# Patient Record
Sex: FEMALE | Race: WHITE
[De-identification: names, ages, dates, MRNs, and addresses within clinical notes are randomized per-mention and may not be internally consistent; named-entity substitution may affect disease eponyms.]

---

## 2019-06-24 ENCOUNTER — HOSPITAL ENCOUNTER (EMERGENCY)
Dept: HOSPITAL 75 - ER | Age: 74
Discharge: HOME | End: 2019-06-24
Payer: MEDICARE

## 2019-06-24 VITALS — BODY MASS INDEX: 30.12 KG/M2 | HEIGHT: 63 IN | WEIGHT: 170 LBS

## 2019-06-24 VITALS — SYSTOLIC BLOOD PRESSURE: 132 MMHG | DIASTOLIC BLOOD PRESSURE: 82 MMHG

## 2019-06-24 DIAGNOSIS — Z90.89: ICD-10-CM

## 2019-06-24 DIAGNOSIS — Z87.81: ICD-10-CM

## 2019-06-24 DIAGNOSIS — S40.812A: ICD-10-CM
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
## 2019-06-24 DIAGNOSIS — Z90.710: ICD-10-CM

## 2019-06-24 DIAGNOSIS — I10: ICD-10-CM
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
## 2019-06-24 DIAGNOSIS — S00.81XA: Primary | ICD-10-CM

## 2019-06-24 DIAGNOSIS — Z88.5: ICD-10-CM

## 2019-06-24 DIAGNOSIS — S80.812A: ICD-10-CM

## 2019-06-24 DIAGNOSIS — Z98.1: ICD-10-CM

## 2019-06-24 PROCEDURE — 72125 CT NECK SPINE W/O DYE: CPT

## 2019-06-24 PROCEDURE — 70450 CT HEAD/BRAIN W/O DYE: CPT

## 2019-06-24 PROCEDURE — 90715 TDAP VACCINE 7 YRS/> IM: CPT

## 2019-06-24 NOTE — XMS REPORT
MU2 Ambulatory Summary

                             Created on: 2018



Zoraida Matson

External Reference #: [id number]

: 1945

Sex: Female



Demographics







                          Address                   [address] [place]  [address]

 

                          Home Phone                [phone]

 

                          Preferred Language        English

 

                          Marital Status            

 

                          Religion Affiliation     Unknown

 

                          Race                      White

 

                          Ethnic Group              Not  or 





Author







                          Author                    Mian Villafuerte

 

                          [facility]

 

                          Address                   [address]

[place], [place]  [id number]



 

                          Phone                     [phone]







Care Team Providers







                    Care Team Member Name    Role                Phone

 

                    Mian Villafuerte       PCP                 [phone]







Allergies and Adverse Reactions







                    Name                Reaction            Notes

 

                    Percocet                                 







Plan of Treatment

Not available.



Medications







                                        Active 

 

             Name         Start Date    Estimated Completion Date    SIG          Comments

 

                Aleve 220 mg oral tablet                                    take 2 tablet (440 mg) by oral route every 8 hours

 as needed                               

 

                losartan-hydrochlorothiazide 50-12.5 mg oral tablet                                    take 1 tablet by oral

 route once daily                        

 

             Kenalog injection                              80mg IM q 3 months prn     

 

                omeprazole 20 mg oral capsule,delayed release(DR/EC)                                    take 1 capsule (20 mg)

 by oral route once daily before a meal     

 

             Curcumin 95 % miscellaneous powder                              Tumeric Curcumin 500mg daily     

 

                Ventolin HFA 90 mcg/actuation inhalation HFA aerosol inhaler                                    inhale 1 puff

 (90 mcg) by inhalation route every 6 hours as needed     

 

             Vitamin D3 2,000 unit oral tablet                              take 2 tablets by oral route daily     

 

                Tylenol Extra Strength 500 mg oral tablet                                    take 2 tablets (1,000 mg) by oral

 route at bedtime                        









                                         

 

             Name         Start Date    Expiration Date    SIG          Comments

 

                cyanocobalamin (vitamin B-12) 1,000 mcg oral tablet                                    take 1 tablet by oral

 route daily                             

 

             Norvasc 5 mg oral tablet                              take 1 tablet (5 mg) by oral route once daily     









Problem List







                    Description         Status              Onset

 

                    Lipoma              Active              2017

 

                    Lipoma              Active              2017

 

                    Lipoma              Active              2018

 

                    Lipoma              Active              2018

 

                    Lipoma              Active              2018

 

                    Lipoma              Active              2018







Vital Signs







      Date    Time    BP-Sys(mm[Hg]    BP-Rosa(mm[Hg])    HR(bpm)    RR(rpm)    Temp    WT    HT    HC    BMI

                    BSA                 BMI Percentile      O2 Sat(%)

 

        2018    11:23:00 AM    144 mmHg    87 mmHg    72 bpm    20 rpm    97.7 F    182.5 lbs      

                                                                  

 

        2018    12:53:00 PM    144 mmHg    87 mmHg    72 bpm    18 rpm    98.1 F    178.5 lbs    63

 in                       31.6195 kg/m    1.8971 m                  

 

        1/3/2018    11:43:00 AM    156 mmHg    79 mmHg    69 bpm    20 rpm    98.2 F    179 lbs    63 in

                          31.71 kg/m2    1.90 m2                    

 

        2017    10:57:00 AM    160 mmHg    82 mmHg    77 bpm    20 rpm    97.8 F    176 lbs    63 

in                        31.1767 kg/m    1.8838 m                  







Social History







                    Name                Description         Comments

 

                    denies alcohol use                         

 

                    Tobacco             Never smoker         







History of Procedures

Not available.



Results Summary

Not available.



History Of Immunizations

Not available.



History of Past Illness







                    Name                Date of Onset       Comments

 

                    Arthritis of knee, right                         

 

                    Asthma                                   

 

                    Diverticulosis of colon                         

 

                    GERD (gastroesophageal reflux disease)                         

 

                    Hemorrhoids                              

 

                    Hypertension                             

 

                    Hyperlipemia                             

 

                    Allergies                                

 

                    Osteoarthritis of hip, unspecified                         

 

                    Prediabetes                              

 

                    Lipoma              2018           

 

                    Lipoma              Dec 20 2017 11:00AM     

 

                    Lipoma              Dec 20 2017 12:33PM     

 

                    Lipoma              Narinder  3 2018 11:47AM     

 

                    Lipoma              2018 12:55PM     







Payers







           Insurance Name    Company Name    Plan Name    Plan Number    Policy Number    Policy Group

 Number                                 Start Date

 

                    Medicare Part B    [employer] [place]              [id number]              N/A

 

                    Aetna Medicare Supplement    [employer] Medicare Supplement              [id number]              N/A







History of Encounters







                    Visit Date          Visit Type          Provider

 

                    2018           Procedures          Mian Villafuerte DO

 

                    2018           Procedures          Mian Villafuerte DO

 

                    1/3/2018            Procedures          Mian Villafuerte DO

 

                    2017          Procedures          Mian Villafuerte DO

## 2019-06-24 NOTE — ED TRAUMA-MULTISYSTEM
General


Chief Complaint:  Trauma-Non Activation


Stated Complaint:  FALL;HEAD INJ


Nursing Triage Note:  


PT STATES SHE WAS WALKING INTO A STORE WHEN SHE TRIPPED AND FELL FACE FORWARD. 


PT DENIES LOC, PT DENIES BLOOD THINNERS. PT HAS A LACERATION TO LEFT HAND. PT 


HAS ABRASIONS TO RIGHT SIDE OF FACE. PT HAS HISTORY OF FUSION OF NECK. PT DENIES




CSPINE TENDERNESS.





History of Present Illness


Date Seen by Provider:  2019


Time Seen by Provider:  13:15


Initial Comments


74-year-old  female presents for fall with facial trauma. She was 

getting out of her daughter's car in a parking lot when she tripped and fell 

face first. He sustained abrasions to her for head, nose, and chin she also has 

a small laceration to her left wrist. She had cervical spinal surgery in the 

last 6 months. She is denying any neck pain, vision changes, loss of 

consciousness, paresthesias or radicular symptoms in the upper extremities, or 

nausea/vomiting. She is unsure of her last tetanus immunization. She is denying 

chest pain or dyspnea. She does not take aspirin or any blood thinners. She was 

able to stand on her own after the fall.


Occurred:  Just Prior to Arrival


Pain/Injury Location:  Face, Head


Method of Injury:  Fall


Loss of Consciousness:  No Loss of Consciousness


Associated Symptoms (Fall):  Denies Symptoms; No Lightheadedness, No Muscle 

Spasms, No Nausea/Vomiting; Neck Pain; No Shortness of Air, No Slurred Speech, 

No Trouble Walking, No Vision Changes





Allergies and Home Medications


Allergies


Coded Allergies:  


     acetaminophen (Verified  Allergy, Unknown, 19)


     oxycodone (Verified  Allergy, Unknown, 19)





Home Medications


Tramadol HCl 50 Mg Tablet, 50 MG PO Q6H PRN for PAIN


   Prescribed by: ASTRID AVILEZ on 19 4580





Patient Home Medication List


Home Medication List Reviewed:  Yes





Review of Systems


Review of Systems


Constitutional:  no symptoms reported, see HPI


Eyes:  No Symptoms Reported, See HPI


Ears:  No Symptoms Reported, See HPI


Nose:  See HPI, Pain


Mouth:  No Symptoms Reported, See HPI; No Loose Teeth


Throat:  No Symptoms to Report, See HPI


Respiratory:  no symptoms reported, see HPI


Cardiovascular:  No Symptoms Reported, See HPI


Gastrointestinal:  no symptoms reported, see HPI


Genitourinary:  no symptoms reported, see HPI


Skin:  see HPI, other (abrasion to face and left wrist)


Psychiatric/Neurological:  No Symptoms Reported





All Other Systems Reviewed


Negative Unless Noted:  Yes





Past Medical-Social-Family Hx


Past Med/Social Hx:  Reviewed Nursing Past Med/Soc Hx


Patient Social History


Alcohol Use:  Denies Use


Recreational Drug Use:  No


Smoking Status:  Never a Smoker


2nd Hand Smoke Exposure:  No


Recent Foreign Travel:  No


Contact w/Someone Who Travel:  No


Recent Infectious Disease Expo:  No


Recent Hopitalizations:  Yes





Immunizations Up To Date


PED Vaccines UTD:  Yes





Seasonal Allergies


Seasonal Allergies:  No





Past Medical History


Surgeries:  Yes (recent neck fusion, hip replacements, left femur fx repair, jose manuel

dder )


Abdominal, Bladder Surgery,  Section, Hysterectomy, Joint Replacement, 

Orthopedic, Tonsillectomy


Respiratory:  No


Cardiac:  Yes


Hypertension


Neurological:  No


GYN History:  Hysterectomy


Genitourinary:  Yes (bladder surgery)


Gastrointestinal:  No


Musculoskeletal:  Yes (neck fusion)


Fractures


Endocrine:  No


HEENT:  No


Cancer:  No


Psychosocial:  No


Integumentary:  Yes (MRSA hx)





Physical Exam


Vital Signs





Vital Signs - First Documented








 19





 12:03 14:52


 


Temp 97.6 


 


Pulse 78 


 


Resp 18 


 


B/P (MAP) 149/98 (115) 


 


Pulse Ox 99 


 


O2 Delivery  Room Air








Height, Weight, BMI


Height: 5'3.00"


Weight: 170lbs. oz. 77.094003ni;  BMI


Method:Stated


General Appearance:  No Apparent Distress, WD/WN


Head:  Contusions, Ecchymosis, Tenderness





Progress/Results/Core Measures


Results/Orders


My Orders





Orders - ASTRID AVILEZ


Ct Head/Cervical Spine Wo (19 13:30)


Dipht,Pertuss(Acell),Tet Adult (Boostrix (19 13:45)


Tramadol Tablet (Ultram Tablet) (19 15:00)





Medications Given in ED





Current Medications








 Medications  Dose


 Ordered  Sig/Eugenia


 Route  Start Time


 Stop Time Status Last Admin


Dose Admin


 


 Diphtheria/


 Tetanus/Acell


 Pertussis  0.5 ml  ONCE ONCE


 IM  19 13:45


 19 13:46 DC 19 13:39


0.5 ML


 


 Tramadol HCl  50 mg  ONCE  ONCE


 PO  19 15:00


 19 15:00 DC 19 14:49


50 MG








Vital Signs/I&O











 19





 12:03 14:52


 


Temp 97.6 97.7


 


Pulse 78 66


 


Resp 18 16


 


B/P (MAP) 149/98 (115) 132/82 (99)


 


Pulse Ox 99 96


 


O2 Delivery  Room Air














Blood Pressure Mean:                    115











Progress


Progress Note :  


   Time:  13:15


Progress Note


Patient seen and evaluated, abrasions for cleaned with Hibiclens and sterile 

water. Will obtain CT of head and neck. Tetanus vaccine.


1400 patient complaining of mild headache, no neurological changes, tramadol 50 

mg 1 by mouth orally for pain.


1430 patient reports improvement in pain. CT negative for acute findings. Apply 

antibiotic ointment and Band-Aids applied to wounds. Discharge instructions and 

return precautions reviewed with the patient QUESTIONS answered.





Diagnostic Imaging





   Diagonstic Imaging:  CT


   Plain Films/CT/US/NM/MRI:  c-spine, head


Comments


NAME:   BETO RODRIGUEZ


[facility] REC#:   [id number]


ACCOUNT#:   [id number]


PT STATUS:   REG ER


:   1945


PHYSICIAN:   ASTRID AVILEZ


ADMIT DATE:   19/ER


                                   ***Draft***


Date of Exam:19





CT HEAD/CERVICAL SPINE WO








PROCEDURE: CT head and CT cervical spine without contrast.





TECHNIQUE: Multiple contiguous axial images were obtained through


the brain and cervical spine without the use of intravenous


contrast. Sagittal and coronal reformations through the cervical


spine were then performed. Auto Exposure Controls were utilized


during the CT exam to meet ALARA standards for radiation dose


reduction. 





INDICATION: Fall.





COMPARISON: No prior studies are available for comparison.





CT HEAD:





The ventricles and sulci are within normal limits. No sulcal


effacement or midline shift is seen. No acute intra-axial or


extra-axial hemorrhage is detected. Cisterns are patent.


Visualized paranasal sinuses are clear.





IMPRESSION: No acute intracranial process is detected.





CT CERVICAL SPINE:





Curvature and alignment of the cervical spine is normal. There


are postop changes of ACDF with anterior plate and screws


extending from C3 through C6. Hardware appears to be intact. No


fracture or loosening is seen. Odontoid is intact. Prevertebral


tissues are normal.





IMPRESSION: C3 through C6 ACDF. No acute feature is detected.





  Dictated on workstation # [id number]








Dict:   19 1412


Trans:   19 1419


 [id number]





Interpreted by:     STEFF PRESTON MD


Electronically signed by:





Departure


Impression





   Primary Impression:  


   Fall


   Qualified Codes:  W19.XXXA - Unspecified fall, initial encounter


   Additional Impression:  


   Abrasion of face and extremities


   Qualified Codes:  S00.81XA - Abrasion of other part of head, initial 

   encounter; S40.812A - Abrasion of left upper arm, initial encounter; S80.812A

   - Abrasion, left lower leg, initial encounter


Disposition:   HOME, SELF-CARE


Condition:  Improved





Departure-Patient Inst.


Decision time for Depature:  14:30


Patient Instructions:  Minor Head Injury (DC), Wound Care (DC)





Add. Discharge Instructions:  


Move slowly from a sitting/lying position to standing.


Increase water intake.


Follow-up with your primary care provider later this week.


Use the tramadol 1 tablet every 8 hours as needed for pain.


For additional pain relief he may take Tylenol 650 mg every 6-8 hours.


Ice for 20 minutes to any areas of tenderness or pain.


Brain rest: Limit screen time (smart phones, tablets, computers, tv) and wear 

sunglasses. 


Return to emergency department for headache not relieved by tramadol or Tylenol,

seizure activity, difficulty concentrating, vision changes or any other urgent 

health care needs.





All discharge instructions reviewed with patient and/or family. Voiced 

understanding.


Scripts


Tramadol HCl (Tramadol HCl) 50 Mg Tablet


50 MG PO Q6H PRN for PAIN, #20 TAB 0 Refills


   Prov: ASTRID AVILEZ         19











ASTRID AVILEZ                  2019 13:50

## 2019-06-24 NOTE — NUR
Patient ambulatory to ER room 10 with family members. Patient complains of a 
fall today. She states she fell forward and tried to catch herself. patient has 
abrasions to her nose and face and abrasions to the left wrist area. Patient is 
complaining of neck pain. She states she always has neck pain but recently had 
surgery on her neck to have a fusion and titanum plate put in. Patient denies 
being on blood thinners. She had no loss of consciousness.

## 2019-06-24 NOTE — XMS REPORT
MU2 Ambulatory Summary

                             Created on: 2018



Zoraida Matson

External Reference #: [id number]

: 1945

Sex: Female



Demographics







                          Address                   [address] [place]  [address]

 

                          Home Phone                [phone]

 

                          Preferred Language        English

 

                          Marital Status            

 

                          Anabaptist Affiliation     Unknown

 

                          Race                      White

 

                          Ethnic Group              Not  or 





Author







                          Author                    Mian Villafuerte

 

                          [facility]

 

                          Address                   [address]

[place], [place]  [id number]



 

                          Phone                     [phone]







Care Team Providers







                    Care Team Member Name    Role                Phone

 

                    Mian Villafuerte       PCP                 [phone]







Allergies and Adverse Reactions







                    Name                Reaction            Notes

 

                    Percocet                                 







Plan of Treatment

Not available.



Medications







                                        Active 

 

             Name         Start Date    Estimated Completion Date    SIG          Comments

 

                Aleve 220 mg oral tablet                                    take 2 tablet (440 mg) by oral route every 8 hours

 as needed                               

 

                losartan-hydrochlorothiazide 50-12.5 mg oral tablet                                    take 1 tablet by oral

 route once daily                        

 

             Kenalog injection                              80mg IM q 3 months prn     

 

                omeprazole 20 mg oral capsule,delayed release(DR/EC)                                    take 1 capsule (20 mg)

 by oral route once daily before a meal     

 

             Curcumin 95 % miscellaneous powder                              Tumeric Curcumin 500mg daily     

 

                Ventolin HFA 90 mcg/actuation inhalation HFA aerosol inhaler                                    inhale 1 puff

 (90 mcg) by inhalation route every 6 hours as needed     

 

             Vitamin D3 2,000 unit oral tablet                              take 2 tablets by oral route daily     

 

                Tylenol Extra Strength 500 mg oral tablet                                    take 2 tablets (1,000 mg) by oral

 route at bedtime                        









                                         

 

             Name         Start Date    Expiration Date    SIG          Comments

 

                cyanocobalamin (vitamin B-12) 1,000 mcg oral tablet                                    take 1 tablet by oral

 route daily                             

 

             Norvasc 5 mg oral tablet                              take 1 tablet (5 mg) by oral route once daily     









Problem List







                    Description         Status              Onset

 

                    Lipoma              Active              2017

 

                    Lipoma              Active              2017

 

                    Lipoma              Active              2018

 

                    Lipoma              Active              2018

 

                    Lipoma              Active              2018

 

                    Lipoma              Active              2018







Vital Signs







      Date    Time    BP-Sys(mm[Hg]    BP-Rosa(mm[Hg])    HR(bpm)    RR(rpm)    Temp    WT    HT    HC    BMI

                    BSA                 BMI Percentile      O2 Sat(%)

 

        2018    12:53:00 PM    144 mmHg    87 mmHg    72 bpm    18 rpm    98.1 F    178.5 lbs    63

 in                       31.62 kg/m2    1.90 m2                    

 

        1/3/2018    11:43:00 AM    156 mmHg    79 mmHg    69 bpm    20 rpm    98.2 F    179 lbs    63 in

                          31.7081 kg/m    1.8997 m                  

 

        2017    10:57:00 AM    160 mmHg    82 mmHg    77 bpm    20 rpm    97.8 F    176 lbs    63 

in                        31.18 kg/m2    1.88 m2                    







Social History







                    Name                Description         Comments

 

                    denies alcohol use                         

 

                    Tobacco             Never smoker         







History of Procedures

Not available.



Results Summary

Not available.



History Of Immunizations

Not available.



History of Past Illness







                    Name                Date of Onset       Comments

 

                    Arthritis of knee, right                         

 

                    Asthma                                   

 

                    Diverticulosis of colon                         

 

                    GERD (gastroesophageal reflux disease)                         

 

                    Hemorrhoids                              

 

                    Hypertension                             

 

                    Hyperlipemia                             

 

                    Allergies                                

 

                    Osteoarthritis of hip, unspecified                         

 

                    Prediabetes                              

 

                    Lipoma              2018           

 

                    Lipoma              Dec 20 2017 11:00AM     

 

                    Lipoma              Dec 20 2017 12:33PM     

 

                    Lipoma              Narinder  3 2018 11:47AM     

 

                    Lipoma              2018 12:55PM     







Payers







           Insurance Name    Company Name    Plan Name    Plan Number    Policy Number    Policy Group

 Number                                 Start Date

 

                    Medicare Part B    [employer]are [employer] [place]              [id number]              N/A

 

                    Aetna Medicare Supplement    Aetna Medicare Supplement              [id number]              N/A







History of Encounters







                    Visit Date          Visit Type          Provider

 

                    2018           Procedures          Mian Villafuerte DO

 

                    1/3/2018            Procedures          Mian Villafuerte DO

 

                    2017          Procedures          Mian Villafuerte DO

## 2019-06-24 NOTE — DIAGNOSTIC IMAGING REPORT
PROCEDURE: CT head and CT cervical spine without contrast.



TECHNIQUE: Multiple contiguous axial images were obtained through

the brain and cervical spine without the use of intravenous

contrast. Sagittal and coronal reformations through the cervical

spine were then performed. Auto Exposure Controls were utilized

during the CT exam to meet ALARA standards for radiation dose

reduction. 



INDICATION: Fall.



COMPARISON: No prior studies are available for comparison.



CT HEAD:



The ventricles and sulci are within normal limits. No sulcal

effacement or midline shift is seen. No acute intra-axial or

extra-axial hemorrhage is detected. Cisterns are patent.

Visualized paranasal sinuses are clear.



IMPRESSION: No acute intracranial process is detected.



CT CERVICAL SPINE:



Curvature and alignment of the cervical spine is normal. There

are postop changes of ACDF with anterior plate and screws

extending from C3 through C6. Hardware appears to be intact. No

fracture or loosening is seen. Odontoid is intact. Prevertebral

tissues are normal.



IMPRESSION: C3 through C6 ACDF. No acute feature is detected.



Dictated by: 



  Dictated on workstation # [id number]

## 2019-06-24 NOTE — XMS REPORT
MU2 Ambulatory Summary

                             Created on: 2018



Zoraida Matson

External Reference #: [id number]

: 1945

Sex: Female



Demographics







                          Address                   [address] [place]  [address]

 

                          Home Phone                [phone]

 

                          Preferred Language        English

 

                          Marital Status            

 

                          Restorationism Affiliation     Unknown

 

                          Race                      White

 

                          Ethnic Group              Not  or 





Author







                          Author                    Mian Villafuerte

 

                          Organization              [facility]

 

                          Address                   [address]

[place], [place]  [address]



 

                          Phone                     [phone]







Care Team Providers







                    Care Team Member Name    Role                Phone

 

                    Mian Villafuerte       PCP                 [phone]







Allergies and Adverse Reactions







                    Name                Reaction            Notes

 

                    Percocet                                 







Plan of Treatment

Not available.



Medications







                                        Active 

 

             Name         Start Date    Estimated Completion Date    SIG          Comments

 

                Aleve 220 mg oral tablet                                    take 2 tablet (440 mg) by oral route every 8 hours

 as needed                               

 

                losartan-hydrochlorothiazide 50-12.5 mg oral tablet                                    take 1 tablet by oral

 route once daily                        

 

             Kenalog injection                              80mg IM q 3 months prn     

 

                omeprazole 20 mg oral capsule,delayed release(DR/EC)                                    take 1 capsule (20 mg)

 by oral route once daily before a meal     

 

             Curcumin 95 % miscellaneous powder                              Tumeric Curcumin 500mg daily     

 

                Ventolin HFA 90 mcg/actuation inhalation HFA aerosol inhaler                                    inhale 1 puff

 (90 mcg) by inhalation route every 6 hours as needed     

 

             Vitamin D3 2,000 unit oral tablet                              take 2 tablets by oral route daily     

 

                Tylenol Extra Strength 500 mg oral tablet                                    take 2 tablets (1,000 mg) by oral

 route at bedtime                        









                                         

 

             Name         Start Date    Expiration Date    SIG          Comments

 

                cyanocobalamin (vitamin B-12) 1,000 mcg oral tablet                                    take 1 tablet by oral

 route daily                             

 

             Norvasc 5 mg oral tablet                              take 1 tablet (5 mg) by oral route once daily     









Problem List







                    Description         Status              Onset

 

                    Lipoma              Active              2017

 

                    Lipoma              Active              2017

 

                    Lipoma              Active              2018

 

                    Lipoma              Active              2018







Vital Signs







      Date    Time    BP-Sys(mm[Hg]    BP-Rosa(mm[Hg])    HR(bpm)    RR(rpm)    Temp    WT    HT    HC    BMI

                    BSA                 BMI Percentile      O2 Sat(%)

 

        1/3/2018    11:43:00 AM    156 mmHg    79 mmHg    69 bpm    20 rpm    98.2 F    179 lbs    63 in

                          31.71 kg/m2    1.90 m2                    

 

        2017    10:57:00 AM    160 mmHg    82 mmHg    77 bpm    20 rpm    97.8 F    176 lbs    63 

in                        31.1767 kg/m    1.8838 m                  







Social History







                    Name                Description         Comments

 

                    denies alcohol use                         

 

                    Tobacco             Never smoker         







History of Procedures

Not available.



Results Summary

Not available.



History Of Immunizations

Not available.



History of Past Illness







                    Name                Date of Onset       Comments

 

                    Arthritis of knee, right                         

 

                    Asthma                                   

 

                    Diverticulosis of colon                         

 

                    GERD (gastroesophageal reflux disease)                         

 

                    Hemorrhoids                              

 

                    Hypertension                             

 

                    Hyperlipemia                             

 

                    Allergies                                

 

                    Osteoarthritis of hip, unspecified                         

 

                    Prediabetes                              

 

                    Lipoma              2018           

 

                    Lipoma              Dec 20 2017 11:00AM     

 

                    Lipoma              Dec 20 2017 12:33PM     

 

                    Lipoma              Narinder  3 2018 11:47AM     







Payers







           Insurance Name    Company Name    Plan Name    Plan Number    Policy Number    Policy Group

 Number                                 Start Date

 

                    Medicare Part B    [place] [place]              [id number]              N/A

 

                    Aetna Medicare Supplement    Aetna Medicare Supplement              [id number]              N/A







History of Encounters







                    Visit Date          Visit Type          Provider

 

                    1/3/2018            Procedures          Mian Villafuerte DO

 

                    2017          Shira Villafuerte DO

## 2019-06-24 NOTE — XMS REPORT
MU2 Ambulatory Summary

                             Created on: 2017



Zoraida Matson

External Reference #: [id number]

: 1945

Sex: Female



Demographics







                          Address                   [address] [place]  [address]

 

                          Home Phone                [phone]

 

                          Preferred Language        English

 

                          Marital Status            

 

                          Protestant Affiliation     Unknown

 

                          Race                      White

 

                          Ethnic Group              Not  or 





Author







                          Author                    Mian Villafuerte

 

                          [facility]

 

                          Address                   [address]

[place], [place]  [address]



 

                          Phone                     [phone]







Care Team Providers







                    Care Team Member Name    Role                Phone

 

                    Mian Villafuerte       PCP                 [phone]







Allergies and Adverse Reactions







                    Name                Reaction            Notes

 

                    Percocet                                 







Plan of Treatment

Not available.



Medications







                                        Active 

 

             Name         Start Date    Estimated Completion Date    SIG          Comments

 

                Aleve 220 mg oral tablet                                    take 2 tablet (440 mg) by oral route every 8 hours

 as needed                               

 

                losartan-hydrochlorothiazide 50-12.5 mg oral tablet                                    take 1 tablet by oral

 route once daily                        

 

             Kenalog injection                              80mg IM q 3 months prn     

 

                omeprazole 20 mg oral capsule,delayed release(DR/EC)                                    take 1 capsule (20 mg)

 by oral route once daily before a meal     

 

             Curcumin 95 % miscellaneous powder                              Tumeric Curcumin 500mg daily     

 

                Ventolin HFA 90 mcg/actuation inhalation HFA aerosol inhaler                                    inhale 1 puff

 (90 mcg) by inhalation route every 6 hours as needed     

 

             Vitamin D3 2,000 unit oral tablet                              take 2 tablets by oral route daily     

 

                Tylenol Extra Strength 500 mg oral tablet                                    take 2 tablets (1,000 mg) by oral

 route at bedtime                        









                                         

 

             Name         Start Date    Expiration Date    SIG          Comments

 

                cyanocobalamin (vitamin B-12) 1,000 mcg oral tablet                                    take 1 tablet by oral

 route daily                             

 

             Norvasc 5 mg oral tablet                              take 1 tablet (5 mg) by oral route once daily     









Problem List







                    Description         Status              Onset

 

                    Lipoma              Active              2017

 

                    Lipoma              Active              2017







Vital Signs







      Date    Time    BP-Sys(mm[Hg]    BP-Rosa(mm[Hg])    HR(bpm)    RR(rpm)    Temp    WT    HT    HC    BMI

                    BSA                 BMI Percentile      O2 Sat(%)

 

        2017    10:57:00 AM    160 mmHg    82 mmHg    77 bpm    20 rpm    97.8 F    176 lbs    63 

in                        31.18 kg/m2    1.88 m2                    







Social History







                    Name                Description         Comments

 

                    denies alcohol use                         

 

                    Tobacco             Never smoker         







History of Procedures

Not available.



Results Summary

Not available.



History Of Immunizations

Not available.



History of Past Illness







                    Name                Date of Onset       Comments

 

                    Arthritis of knee, right                         

 

                    Asthma                                   

 

                    Diverticulosis of colon                         

 

                    GERD (gastroesophageal reflux disease)                         

 

                    Hemorrhoids                              

 

                    Hypertension                             

 

                    Hyperlipemia                             

 

                    Allergies                                

 

                    Osteoarthritis of hip, unspecified                         

 

                    Prediabetes                              

 

                    Lipoma              2017           

 

                    Lipoma              Dec 20 2017 11:00AM     

 

                    Lipoma              Dec 20 2017 12:33PM     







Payers







           Insurance Name    Company Name    Plan Name    Plan Number    Policy Number    Policy Group

 Number                                 Start Date

 

                    Medicare Part B    [place] [place]              [id number]              N/A

 

                    Aetna Medicare Supplement    Aetna Medicare Supplement              [id number]              N/A







History of Encounters







                    Visit Date          Visit Type          Provider

 

                    2017          Procedures          Mian Villafuerte DO

## 2019-06-24 NOTE — XMS REPORT
[facility] Clinical Summary

                             Created on: 2016



Zoraida Matson

External Reference #: [id number]

: 1945

Sex: Female



Demographics







                          Address                   [address] [place]  [address]

 

                          Home Phone                [phone]

 

                          Preferred Language        English

 

                          Marital Status            M

 

                          Religion Affiliation     Unknown

 

                          Race                      Unknown

 

                          Ethnic Group              Not  or 





Author







                          Author                    Admin, NILSON

 

                          Organization              [facility]

 

                          Address                   Unknown

 

                          Phone                     Unavailable







Allergies, Adverse Reactions, Alerts







           Allergy Name    Reaction Description    Start Date    Severity    Status     Provider

 

           PERCOCET    jaundice        Critical    Active     Wally Quiroz MD







Conditions or Problems







        Problem Name    Problem Code    Onset Date    Status    Entry Date    Provider    Comment    Standard

 Description                            Annotate

 

           Hypertension, benign essential    401.1          Active         Wally Quiroz MD                               Benign essential hypertension     

 

        Insect bite    919.4        Active        Wally Quiroz MD            Insect

 bite, nonvenomous, of other, multiple, and unspecified sites, without mention 
of infection                             

 

        GERD    530.81        Active        Wally Quiroz MD            Esophageal 

reflux                                   







Medication List







        Medication    Instructions    Start Date    Stop Date    Generic Name    NDC     Status    Provider

                                        Patient Instruction









                PREDNISONE 20 MG TABS    2 daily for 3 days then 1 daily for 3 days           

           PREDNISONE    62611037502    Active     Wally Quiroz MD               Active

 

             ACETAMINOPHEN 500 MG ORAL TABS    2 by mouth at bedtime                     ACETAMINOPHEN

             [id number]    Active       Wally Quiroz MD                 Active

 

             FISH OIL 1200 MG ORAL CAPS    Take one by mouth daily                     OMEGA-3 FATTY

 ACIDS       [id number]    Active       Wally Quiroz MD                 Active

 

             B-12 1000 MCG ORAL CAPS    Take one by mouth daily                     CYANOCOBALAMIN 

             [id number]    Active       Wally Quiroz MD                 Active

 

             ALEVE 220 MG ORAL CAPS    2 by mouth every morning                     NAPROXEN SODIUM

             [id number]    Active       Wally Quiroz MD                 Active

 

             SILYMARIN ORAL CAPS    Take one by mouth daily                     MILK THISTLE-TURMERIC

             [id number]    Active       Wally Quiroz MD                 Active

 

             VITAMIN D3 2000 UNIT ORAL CAPS    Take one by mouth daily                     CHOLECALCIFEROL

             [id number]    Active       Wally Quiroz MD                 Active

 

           LISINOPRIL 10 MG ORAL TABS    Take one by mouth daily                   LISINOPRIL    

[id number]     Active          Wally Quiroz MD                    Active

 

           OMEPRAZOLE 10 MG ORAL CPDR    Take one by mouth daily                   OMEPRAZOLE    

[id number]     Active          Wally Quiroz MD                    Active







Vital Signs







           Date       Name       Value      Unit       Range      Description

 

               blood pressure, diastolic - 8462-4    84         mm[Hg]                BP burk

 

               blood pressure, systolic - 8480-6    149        mm[Hg]                BP sys

 

               height E&M - 8302-2    63         [in_us]               Bdy height

 

               pulse rate E&M - 8867-4    67         /min                  Heart rate

 

               temperature E&M    98.7       [degF]                Body temperature

 

               weight E&M - 3141-9    178.4      [lb_av]               Weight Measured







Encounters







             Code         Encounter    Date         Provider     Facility

 

             CPT-16871    Level 2 New Patient     11:40:56 CDT    Wally Quiroz MD    [facility]

## 2019-06-24 NOTE — XMS REPORT
MU2 Ambulatory Summary

                             Created on: 2018



Zoraida Matson

External Reference #: [id number]

: 1945

Sex: Female



Demographics







                          Address                   [address] [place]  [address]

 

                          Home Phone                [phone]

 

                          Preferred Language        English

 

                          Marital Status            

 

                          Restorationism Affiliation     Unknown

 

                          Race                      White

 

                          Ethnic Group              Not  or 





Author







                          Author                    Mian Villafuerte

 

                          [facility]

 

                          Address                   [address]

[place], [place]  [address]



 

                          Phone                     [phone]







Care Team Providers







                    Care Team Member Name    Role                Phone

 

                    Mian Villafuerte       PCP                 [phone]







Allergies and Adverse Reactions







                    Name                Reaction            Notes

 

                    Percocet                                 







Plan of Treatment

Not available.



Medications







                                        Active 

 

             Name         Start Date    Estimated Completion Date    SIG          Comments

 

                Aleve 220 mg oral tablet                                    take 2 tablet (440 mg) by oral route every 8 hours

 as needed                               

 

                losartan-hydrochlorothiazide 50-12.5 mg oral tablet                                    take 1 tablet by oral

 route once daily                        

 

             Kenalog injection                              80mg IM q 3 months prn     

 

                omeprazole 20 mg oral capsule,delayed release(DR/EC)                                    take 1 capsule (20 mg)

 by oral route once daily before a meal     

 

             Curcumin 95 % miscellaneous powder                              Tumeric Curcumin 500mg daily     

 

                Ventolin HFA 90 mcg/actuation inhalation HFA aerosol inhaler                                    inhale 1 puff

 (90 mcg) by inhalation route every 6 hours as needed     

 

             Vitamin D3 2,000 unit oral tablet                              take 2 tablets by oral route daily     

 

                Tylenol Extra Strength 500 mg oral tablet                                    take 2 tablets (1,000 mg) by oral

 route at bedtime                        









                                         

 

             Name         Start Date    Expiration Date    SIG          Comments

 

                cyanocobalamin (vitamin B-12) 1,000 mcg oral tablet                                    take 1 tablet by oral

 route daily                             

 

             Norvasc 5 mg oral tablet                              take 1 tablet (5 mg) by oral route once daily     









Problem List







                    Description         Status              Onset

 

                    Lipoma              Active              2017

 

                    Lipoma              Active              2017

 

                    Lipoma              Active              2018

 

                    Lipoma              Active              2018







Vital Signs







      Date    Time    BP-Sys(mm[Hg]    BP-Rosa(mm[Hg])    HR(bpm)    RR(rpm)    Temp    WT    HT    HC    BMI

                    BSA                 BMI Percentile      O2 Sat(%)

 

        1/3/2018    11:43:00 AM    156 mmHg    79 mmHg    69 bpm    20 rpm    98.2 F    179 lbs    63 in

                          31.71 kg/m2    1.90 m2                    

 

        2017    10:57:00 AM    160 mmHg    82 mmHg    77 bpm    20 rpm    97.8 F    176 lbs    63 

in                        31.1767 kg/m    1.8838 m                  







Social History







                    Name                Description         Comments

 

                    denies alcohol use                         

 

                    Tobacco             Never smoker         







History of Procedures

Not available.



Results Summary

Not available.



History Of Immunizations

Not available.



History of Past Illness







                    Name                Date of Onset       Comments

 

                    Arthritis of knee, right                         

 

                    Asthma                                   

 

                    Diverticulosis of colon                         

 

                    GERD (gastroesophageal reflux disease)                         

 

                    Hemorrhoids                              

 

                    Hypertension                             

 

                    Hyperlipemia                             

 

                    Allergies                                

 

                    Osteoarthritis of hip, unspecified                         

 

                    Prediabetes                              

 

                    Lipoma              2018           

 

                    Lipoma              Dec 20 2017 11:00AM     

 

                    Lipoma              Dec 20 2017 12:33PM     

 

                    Lipoma              Narinder  3 2018 11:47AM     







Payers







           Insurance Name    Company Name    Plan Name    Plan Number    Policy Number    Policy Group

 Number                                 Start Date

 

                    Medicare Part B    [place] [place]              [id number]              N/A

 

                    Aetna Medicare Supplement    Aetna Medicare Supplement              [id number]              N/A







History of Encounters







                    Visit Date          Visit Type          Provider

 

                    1/3/2018            Procedures          Mian Villafuerte DO

 

                    2017          Shira Villafuerte DO

## 2019-06-24 NOTE — XMS REPORT
Continuity of Care Document

                             Created on: 2019



Zoraida Matson

External Reference #: [id number]

: 1945

Sex: Female



Demographics







                          Address                   [address] [place]  [address]

 

                          Home Phone                [phone] x

 

                          Preferred Language        Unknown

 

                          Marital Status            Unknown

 

                          Holiness Affiliation     Unknown

 

                          Race                      Unknown

 

                          Ethnic Group              Unknown





Author







                          Organization              Unknown

 

                          Address                   Unknown

 

                          Phone                     [phone]



              



Allergies

      





             Active              Description              Code              Type              Severity

                Reaction              Onset              Reported/Identified              Relationship

 to Patient                             Clinical Status        

 

           Yes              Levaquin                             Drug              N/A              N/A

                                                                         

 

             Yes              Percocet 5/325                             Drug              N/A       

             N/A                                                                    



                    



Medications

      



There is no data.                  



Problems

      





             Date Dx Coded              Attending              Type              Code              Diagnosis

                                        Diagnosed By        

 

                2019              GRACE MALAGON              Reason For Visit              J30.9

                          Allergic rhinitis, unspecified                       

 

                2019              GRACE MALAGON              Reason For Visit              J30.9

                          Allergic rhinitis, unspecified                       

 

                2019              GRACE MALAGON              Final              J30.2       

                          Other seasonal allergic rhinitis                       

 

                2019              GRACE MALAGON              Reason For Visit              R51

                          Headache                           

 

                2019              GRACE MALAGON              Reason For Visit              J30.9

                          Allergic rhinitis, unspecified                       

 

                2019              GRACE MALAGON              Reason For Visit              J30.9

                          Allergic rhinitis, unspecified                       

 

                2019              GRACE MALAGON              Reason For Visit              J30.9

                          Allergic rhinitis, unspecified                       



                              



Procedures

      



There is no data.                  



Results

      



There is no data.              



Encounters

      





                ACCT No.              Visit Date/Time              Discharge              Status      

                Pt. Type              Provider              Facility              Loc./Unit      

                                        Complaint        

 

                [id number]              2018 12:03:15              2018 23:59:59              

[facility]              Outpatient              Mian Villafuerte                                    

                                                 

 

                [id number]              2018 11:42:37              2018 23:59:59              

[facility]              Outpatient              Mian Villafuerte                                    

                                                 

 

                [id number]              2018 11:46:28              2018 23:59:59              

[facility]              Outpatient              [facility]                                    

                                                 

 

                [id number]              2017 11:29:03              2017 23:59:59              

[facility]              Outpatient              Mian Villafuerte                                    

                                                 

 

                    [id number]              2019 10:00:33              2019 23:59:59        

                [facility]              Outpatient              GRACE MALAGON              [facility]                       

 

                    [id number]              2019 10:14:51              2019 23:59:59        

                DIS              Outpatient              GRACE MALAGON              [facility]                       

 

                    [id number]              2019 10:15:00              2019 23:59:59        

                DIS              Outpatient              GRACE MALAGON              [facility]                       

 

                    [id number]              2019 10:14:34              2019 23:59:59        

                DIS              Outpatient              GRACE MALAGON              [facility]                       

 

                    [id number]              2019 10:11:39              2019 23:59:59        

                DIS              Outpatient              GRACE MALAGON              [facility]                       

 

                    [id number]              2019 10:00:00              2019 23:59:59        

                DIS              Outpatient              GRACE MALAGON              [facility]                       

 

                    [id number]              2019 14:30:00              2019 23:59:59        

                DIS              Outpatient              GRACE MALAGON              [facility]                       

 

                    [id number]              2019 09:52:33              2019 23:59:59        

                DIS              Outpatient              GRACE MALAGON              [facility]                       

 

                    [id number]              2019 09:54:25              2019 23:59:59        

                DIS              Outpatient              GRACE MALAGON              [facility]                       

 

                    [id number]              2019 10:00:00              2019 23:59:59        

                DIS              Outpatient              GRACE MALAGON              [facility]                       

 

                    [id number]              2019 09:45:00              2019 23:59:59        

                DIS              Outpatient              GRACE MALAGON              [facility]                       

 

                    [id number]              2019 09:52:02              2019 23:59:59        

                DIS              Outpatient              GRACE MALAGON              [facility]                       

 

                    [id number]              2019 10:26:58              2019 23:59:59        

                DIS              Outpatient              GRACE MALAGON              [facility]                       

 

                    [id number]              2019 15:00:00              2019 23:59:59        

                DIS              Outpatient              GRACE MALAGON              [facility]                       

 

                    [id number]              2019 09:30:00              2019 23:59:59        

                DIS              Outpatient              GRACE MALAGON              [facility]                       

 

                    [id number]              2019 10:00:00              2019 23:59:59        

                DIS              Outpatient              GRACE MALAGON              [facility]                       

 

                    [id number]              2019 09:00:00              2019 23:59:59        

                DIS              Outpatient              GRACE MALAGON              [facility]                       

 

                    [id number]              2019 09:45:00              2019 23:59:59        

                DIS              Outpatient              GRACE MALAGON              [facility]                       

 

                    [id number]              2019 10:00:00              2019 23:59:59        

                DIS              Outpatient              GRACE MALAGON              [facility]                       

 

                    [id number]              2019 11:49:46              2019 23:59:59        

                DIS              Outpatient              GRACE MALAGON              [facility]                       

 

                    [id number]              2019 10:58:13              2019 23:59:59        

                DIS              Outpatient              GRACE MALAGON              [facility]                       

 

                    [id number]              2019 09:30:00              2019 23:59:59        

                DIS              Outpatient              GRACE MALAGON              [facility]                       

 

                    [id number]              01/10/2019 10:02:31              01/10/2019 23:59:59        

                DIS              Outpatient              GRACE MALAGON              [facility]                       

 

                    [id number]              12/10/2018 11:33:05              12/10/2018 23:59:59        

                DIS              Outpatient              GRACE MALAGON              [facility]              [facility]                            

 

                    [id number]              2018 15:37:07              2018 23:59:59        

                DIS              Outpatient              GRACE MALAGON              [facility]              [facility]                            

 

                    [id number]              2018 14:15:00              2018 23:59:59        

                DIS              Outpatient              GRACE MALAGON              [facility]                       

 

                    [id number]              2018 09:17:45              2018 23:59:59        

                DIS              Outpatient              Anayeli Morris              [facility]                       

 

                    [id number]              2018 09:15:00              2018 23:59:59        

                DIS              Outpatient              GRACE MALAGON              [facility]                       

 

                    [id number]              2018 12:01:02              2018 23:59:59        

                DIS              Outpatient              MorrisAnayeli              [facility]                       

 

                    [id number]              2018 11:07:49              2018 23:59:59        

                DIS              Outpatient              MorrisBhaskarAnayeli E              [facility]                       

 

                    [id number]              2018 10:50:53              2018 23:59:59        

                DIS              Outpatient              GRACE MALAGON              [facility]              [facility]                            

 

                    [id number]              2018 16:53:48              2018 23:59:59        

                DIS              Outpatient              GRACE MALAGON              [facility]                       

 

                    [id number]              2018 09:23:32              2018 23:59:59        

                DIS              Outpatient              GRACE MALAGON              [facility]                       

 

                    [id number]              2018 11:30:00              2018 23:59:59        

                DIS              Outpatient              GRACE MALAGON              [facility]                       

 

                    [id number]              2018 09:25:17              2018 23:59:59        

                DIS              Outpatient              GRACE MALAGON              [facility]                       

 

                    [id number]              2018 10:42:58              2018 23:59:59        

                DIS              Outpatient              GRACE MALAGON              [facility]                       

 

                    [id number]              2018 09:54:44              2018 23:59:59        

                DIS              Outpatient              GRACE MALAGON              [facility]                       

 

                    [id number]              2018 10:37:11              2018 23:59:59        

                DIS              Outpatient              GRACE MALAGON              [facility]              [facility]                            

 

                    [id number]              2018 09:56:50              2018 23:59:59        

                DIS              Outpatient              GRACE MALAGON              [facility]                       

 

                    [id number]              2018 10:30:00              2018 23:59:59        

                DIS              Outpatient              GRACE MALAGON              [facility]                       

 

                    [id number]              2017 10:00:00              2017 23:59:59        

                DIS              Outpatient              GRAEC MALAGON              [facility]                       

 

                    [id number]              2017 13:30:00              2017 23:59:59        

                DIS              Outpatient              GRACE MALAGON              [facility]                       

 

                    [id number]              2017 12:52:17              2017 23:59:59        

                DIS              Outpatient              GRACE MALAGON              [facility]              [facility]                   PELVIC PAIN, HEMATURIA    

    

 

                    [id number]              2017 10:54:42              2017 23:59:59        

                DIS              Outpatient              GRACE MALAGON              [facility]                       

 

                    [id number]              2017 11:45:00              2017 23:59:59        

                DIS              Outpatient              GRACE MALAGON              [facility]                       

 

                    [id number]              2017 13:47:19              2017 23:59:59        

                DIS              Outpatient              GRACE MALAGON              [facility]                       

 

                    [id number]              2017 13:27:02              2017 23:59:59        

                DIS              Outpatient              GRACE MALAGON              [facility]                       

 

                    [id number]              2017 08:36:27              2017 23:59:59        

                DIS              Outpatient              GRACE MALAGON              [facility]                       

 

                    [id number]              2017 08:35:07              2017 23:59:59        

                DIS              Outpatient              GRACE MALAGON              [facility]                       

 

                    [id number]              2017 10:30:15              2017 23:59:59        

                [facility]              [facility]              GRACE MALAGON              [facility]              [facility]                   diagnostic, left, mass increasing

 since last mammo, 2 - 3 o'clock, 10 mm by 7 mm        

 

             [id number]              2019 02:00:31                                            Document

 Registration                                                                       

 

             [id number]              2019 02:01:17                                            Document

 Registration                                                                       

 

             [id number]              2019 02:00:20                                            Document

 Registration                                                                       

 

             [id number]              2019 02:00:16                                            Document

 Registration                                                                       

 

             [id number]              2018 02:02:00                                            Document

 Registration                                                                       

 

             [id number]              2018 10:01:39                                            Document

 Registration                                                                       

 

             [id number]              2018 10:49:20                                            Document

 Registration                                                                       

 

             [id number]              2018 10:37:22                                            Document

 Registration                                                                       

 

             [id number]              2018 09:52:30                                            Document

 Registration                                                                       

 

             [id number]              05/10/2017 09:46:45                                            Document

 Registration                                                                       

 

             [id number]              2017 09:02:12                                            Document

 Registration                                                                       

 

             [id number]              2017 02:02:38                                            Document

 Registration                                                                       

 

             [id number]              2017 02:00:17                                            Document

 Registration

## 2019-06-24 NOTE — XMS REPORT
MU2 Ambulatory Summary

                             Created on: 2017



Zoraida Matson

External Reference #: [id number]

: 1945

Sex: Female



Demographics







                          Address                   [address] [place]  [address]

 

                          Home Phone                [phone]

 

                          Preferred Language        English

 

                          Marital Status            

 

                          [employer] Affiliation     Unknown

 

                          Race                      White

 

                          Ethnic Group              Not  or 





Author







                          Author                    Mian Villafuerte

 

                          [facility]

 

                          Address                   [address]

[place], [place]  [id number]



 

                          Phone                     [phone]







Care Team Providers







                    Care Team Member Name    Role                Phone

 

                    Mian Villafuerte       PCP                 [phone]







Allergies and Adverse Reactions







                    Name                Reaction            Notes

 

                    Percocet                                 







Plan of Treatment

Not available.



Medications







                                        Active 

 

             Name         Start Date    Estimated Completion Date    SIG          Comments

 

                Aleve 220 mg oral tablet                                    take 2 tablet (440 mg) by oral route every 8 hours

 as needed                               

 

                losartan-hydrochlorothiazide 50-12.5 mg oral tablet                                    take 1 tablet by oral

 route once daily                        

 

             Kenalog injection                              80mg IM q 3 months prn     

 

                omeprazole 20 mg oral capsule,delayed release(DR/EC)                                    take 1 capsule (20 mg)

 by oral route once daily before a meal     

 

             Curcumin 95 % miscellaneous powder                              Tumeric Curcumin 500mg daily     

 

                Ventolin HFA 90 mcg/actuation inhalation HFA aerosol inhaler                                    inhale 1 puff

 (90 mcg) by inhalation route every 6 hours as needed     

 

             Vitamin D3 2,000 unit oral tablet                              take 2 tablets by oral route daily     

 

                Tylenol Extra Strength 500 mg oral tablet                                    take 2 tablets (1,000 mg) by oral

 route at bedtime                        









                                         

 

             Name         Start Date    Expiration Date    SIG          Comments

 

                cyanocobalamin (vitamin B-12) 1,000 mcg oral tablet                                    take 1 tablet by oral

 route daily                             

 

             Norvasc 5 mg oral tablet                              take 1 tablet (5 mg) by oral route once daily     









Problem List







                    Description         Status              Onset

 

                    Lipoma              Active              2017







Vital Signs







      Date    Time    BP-Sys(mm[Hg]    BP-Rosa(mm[Hg])    HR(bpm)    RR(rpm)    Temp    WT    HT    HC    BMI

                    BSA                 BMI Percentile      O2 Sat(%)

 

        2017    10:57:00 AM    160 mmHg    82 mmHg    77 bpm    20 rpm    97.8 F    176 lbs    63 

in                        31.18 kg/m2    1.88 m2                    







Social History







                    Name                Description         Comments

 

                    denies alcohol use                         

 

                    Tobacco             Never smoker         







History of Procedures

Not available.



Results Summary

Not available.



History Of Immunizations

Not available.



History of Past Illness







                    Name                Date of Onset       Comments

 

                    Arthritis of knee, right                         

 

                    Asthma                                   

 

                    Diverticulosis of colon                         

 

                    GERD (gastroesophageal reflux disease)                         

 

                    Hemorrhoids                              

 

                    Hypertension                             

 

                    Hyperlipemia                             

 

                    Allergies                                

 

                    Osteoarthritis of hip, unspecified                         

 

                    Prediabetes                              

 

                    Lipoma              2017           

 

                    Lipoma              Dec 20 2017 11:00AM     







Payers







           Insurance Name    Company Name    Plan Name    Plan Number    Policy Number    Policy Group

 Number                                 Start Date

 

                    Medicare Part B    [employer] [place]              [id number]              N/A

 

                    [employer] Medicare Supplement    [employer] Medicare Supplement              [id number]              N/A







History of Encounters







                    Visit Date          Visit Type          Provider

 

                    2017          Procedures          Mian Villafuerte DO